# Patient Record
Sex: FEMALE
[De-identification: names, ages, dates, MRNs, and addresses within clinical notes are randomized per-mention and may not be internally consistent; named-entity substitution may affect disease eponyms.]

---

## 2018-06-06 PROBLEM — Z00.00 ENCOUNTER FOR PREVENTIVE HEALTH EXAMINATION: Status: ACTIVE | Noted: 2018-06-06

## 2018-06-10 ENCOUNTER — FORM ENCOUNTER (OUTPATIENT)
Age: 48
End: 2018-06-10

## 2018-06-11 ENCOUNTER — APPOINTMENT (OUTPATIENT)
Dept: ORTHOPEDIC SURGERY | Facility: CLINIC | Age: 48
End: 2018-06-11

## 2018-06-11 ENCOUNTER — APPOINTMENT (OUTPATIENT)
Age: 48
End: 2018-06-11
Payer: MEDICAID

## 2018-06-11 ENCOUNTER — OUTPATIENT (OUTPATIENT)
Dept: OUTPATIENT SERVICES | Facility: HOSPITAL | Age: 48
LOS: 1 days | End: 2018-06-11
Payer: MEDICAID

## 2018-06-11 ENCOUNTER — APPOINTMENT (OUTPATIENT)
Age: 48
End: 2018-06-11

## 2018-06-11 VITALS — RESPIRATION RATE: 16 BRPM | HEIGHT: 66 IN

## 2018-06-11 VITALS — RESPIRATION RATE: 16 BRPM

## 2018-06-11 DIAGNOSIS — Z78.9 OTHER SPECIFIED HEALTH STATUS: ICD-10-CM

## 2018-06-11 DIAGNOSIS — M17.12 UNILATERAL PRIMARY OSTEOARTHRITIS, LEFT KNEE: ICD-10-CM

## 2018-06-11 DIAGNOSIS — S93.421A SPRAIN OF DELTOID LIGAMENT OF RIGHT ANKLE, INITIAL ENCOUNTER: ICD-10-CM

## 2018-06-11 DIAGNOSIS — M25.562 PAIN IN LEFT KNEE: ICD-10-CM

## 2018-06-11 DIAGNOSIS — Z82.49 FAMILY HISTORY OF ISCHEMIC HEART DISEASE AND OTHER DISEASES OF THE CIRCULATORY SYSTEM: ICD-10-CM

## 2018-06-11 PROCEDURE — 73564 X-RAY EXAM KNEE 4 OR MORE: CPT | Mod: 26,LT

## 2018-06-11 PROCEDURE — 20615 TREATMENT OF BONE CYST: CPT | Mod: LT

## 2018-06-11 PROCEDURE — 99204 OFFICE O/P NEW MOD 45 MIN: CPT | Mod: 25

## 2018-06-11 PROCEDURE — 73564 X-RAY EXAM KNEE 4 OR MORE: CPT

## 2018-06-11 RX ORDER — MELOXICAM 15 MG/1
15 TABLET ORAL
Qty: 30 | Refills: 2 | Status: ACTIVE | COMMUNITY
Start: 2018-06-11 | End: 1900-01-01

## 2018-06-12 PROBLEM — S93.421A SPRAIN OF ANKLE, DELTOID, RIGHT: Status: ACTIVE | Noted: 2018-06-11

## 2018-06-12 PROBLEM — M17.12 PRIMARY OSTEOARTHRITIS OF LEFT KNEE: Status: ACTIVE | Noted: 2018-06-12

## 2018-06-12 PROBLEM — M25.562 LEFT KNEE PAIN: Noted: 2018-06-08

## 2018-07-24 ENCOUNTER — FORM ENCOUNTER (OUTPATIENT)
Age: 48
End: 2018-07-24

## 2018-07-25 ENCOUNTER — APPOINTMENT (OUTPATIENT)
Dept: RADIOLOGY | Facility: CLINIC | Age: 48
End: 2018-07-25

## 2018-07-25 ENCOUNTER — APPOINTMENT (OUTPATIENT)
Dept: ORTHOPEDIC SURGERY | Facility: CLINIC | Age: 48
End: 2018-07-25
Payer: MEDICAID

## 2018-07-25 ENCOUNTER — OUTPATIENT (OUTPATIENT)
Dept: OUTPATIENT SERVICES | Facility: HOSPITAL | Age: 48
LOS: 1 days | End: 2018-07-25
Payer: MEDICAID

## 2018-07-25 VITALS — WEIGHT: 165 LBS | HEIGHT: 66 IN | BODY MASS INDEX: 26.52 KG/M2

## 2018-07-25 DIAGNOSIS — M72.2 PLANTAR FASCIAL FIBROMATOSIS: ICD-10-CM

## 2018-07-25 PROCEDURE — 73610 X-RAY EXAM OF ANKLE: CPT | Mod: 26,RT

## 2018-07-25 PROCEDURE — 99213 OFFICE O/P EST LOW 20 MIN: CPT

## 2018-07-25 PROCEDURE — 73610 X-RAY EXAM OF ANKLE: CPT

## 2018-07-27 PROBLEM — M72.2 PLANTAR FASCIITIS, RIGHT: Status: ACTIVE | Noted: 2018-07-27

## 2019-07-09 ENCOUNTER — FORM ENCOUNTER (OUTPATIENT)
Age: 49
End: 2019-07-09

## 2019-07-10 ENCOUNTER — APPOINTMENT (OUTPATIENT)
Dept: ORTHOPEDIC SURGERY | Facility: CLINIC | Age: 49
End: 2019-07-10
Payer: MEDICAID

## 2019-07-10 ENCOUNTER — APPOINTMENT (OUTPATIENT)
Dept: RADIOLOGY | Facility: CLINIC | Age: 49
End: 2019-07-10

## 2019-07-10 ENCOUNTER — OUTPATIENT (OUTPATIENT)
Dept: OUTPATIENT SERVICES | Facility: HOSPITAL | Age: 49
LOS: 1 days | End: 2019-07-10
Payer: MEDICAID

## 2019-07-10 VITALS — BODY MASS INDEX: 26.52 KG/M2 | WEIGHT: 165 LBS | HEIGHT: 66 IN

## 2019-07-10 DIAGNOSIS — M25.462 EFFUSION, LEFT KNEE: ICD-10-CM

## 2019-07-10 LAB
B PERT IGG+IGM PNL SER: NORMAL
COLOR FLD: NORMAL
FLUID INTAKE SUBSTANCE CLASS: NORMAL
LYMPHOCYTES # FLD MANUAL: 34 %
MONOS+MACROS NFR FLD MANUAL: 42 %
NEUTS SEG # FLD MANUAL: 24 %
RBC # FLD MANUAL: ABNORMAL /UL
SYCRY CLARITY: NORMAL
SYCRY COLOR: NORMAL
SYCRY ID: NORMAL
SYCRY TUBE: NORMAL
TOTAL CELLS COUNTED FLD: 475 /UL
TUBE TYPE: NORMAL
URATE AMN-MCNC: NORMAL

## 2019-07-10 PROCEDURE — 73564 X-RAY EXAM KNEE 4 OR MORE: CPT | Mod: 26,LT

## 2019-07-10 PROCEDURE — 73564 X-RAY EXAM KNEE 4 OR MORE: CPT

## 2019-07-10 PROCEDURE — 99214 OFFICE O/P EST MOD 30 MIN: CPT | Mod: 25

## 2019-07-10 PROCEDURE — 20610 DRAIN/INJ JOINT/BURSA W/O US: CPT | Mod: LT

## 2019-07-24 LAB — BACTERIA FLD CULT: NORMAL

## 2024-10-05 ENCOUNTER — EMERGENCY (EMERGENCY)
Facility: HOSPITAL | Age: 54
LOS: 1 days | Discharge: HOME | End: 2024-10-05
Attending: STUDENT IN AN ORGANIZED HEALTH CARE EDUCATION/TRAINING PROGRAM
Payer: SELF-PAY

## 2024-10-05 VITALS
HEART RATE: 55 BPM | HEIGHT: 66 IN | OXYGEN SATURATION: 95 % | SYSTOLIC BLOOD PRESSURE: 116 MMHG | RESPIRATION RATE: 18 BRPM | WEIGHT: 188.94 LBS | DIASTOLIC BLOOD PRESSURE: 71 MMHG | TEMPERATURE: 98 F

## 2024-10-05 PROCEDURE — 96372 THER/PROPH/DIAG INJ SC/IM: CPT

## 2024-10-05 PROCEDURE — 99285 EMERGENCY DEPT VISIT HI MDM: CPT

## 2024-10-05 PROCEDURE — 73200 CT UPPER EXTREMITY W/O DYE: CPT | Mod: 26,RT,MC

## 2024-10-05 PROCEDURE — 73130 X-RAY EXAM OF HAND: CPT

## 2024-10-05 PROCEDURE — 73200 CT UPPER EXTREMITY W/O DYE: CPT | Mod: MC

## 2024-10-05 PROCEDURE — 73110 X-RAY EXAM OF WRIST: CPT

## 2024-10-05 PROCEDURE — 99284 EMERGENCY DEPT VISIT MOD MDM: CPT | Mod: 25

## 2024-10-05 PROCEDURE — 73130 X-RAY EXAM OF HAND: CPT | Mod: 26,RT

## 2024-10-05 PROCEDURE — 73110 X-RAY EXAM OF WRIST: CPT | Mod: 26,RT

## 2024-10-05 RX ORDER — ACETAMINOPHEN 325 MG
650 TABLET ORAL ONCE
Refills: 0 | Status: COMPLETED | OUTPATIENT
Start: 2024-10-05 | End: 2024-10-05

## 2024-10-05 RX ORDER — MORPHINE SULFATE 30 MG/1
6 TABLET, FILM COATED, EXTENDED RELEASE ORAL ONCE
Refills: 0 | Status: DISCONTINUED | OUTPATIENT
Start: 2024-10-05 | End: 2024-10-05

## 2024-10-05 RX ORDER — KETOROLAC TROMETHAMINE 10 MG/1
15 TABLET, FILM COATED ORAL ONCE
Refills: 0 | Status: DISCONTINUED | OUTPATIENT
Start: 2024-10-05 | End: 2024-10-05

## 2024-10-05 RX ADMIN — Medication 500 MILLIGRAM(S): at 12:07

## 2024-10-05 RX ADMIN — Medication 650 MILLIGRAM(S): at 12:07

## 2024-10-05 RX ADMIN — KETOROLAC TROMETHAMINE 15 MILLIGRAM(S): 10 TABLET, FILM COATED ORAL at 12:08

## 2024-10-05 RX ADMIN — MORPHINE SULFATE 6 MILLIGRAM(S): 30 TABLET, FILM COATED, EXTENDED RELEASE ORAL at 13:41

## 2024-10-05 NOTE — ED PROVIDER NOTE - CLINICAL SUMMARY MEDICAL DECISION MAKING FREE TEXT BOX
Patient is a 54-year-old female with no past medical history presenting with right hand pain And edema after doing push-ups last night.  patient states she was doing push-ups on the wall last night.  patient noted edema and pain to the right hand, specifically over the third digit.  VSS NVI.   -   Will control pain, imaging to rule out fractures, reassess.

## 2024-10-05 NOTE — ED PROVIDER NOTE - PHYSICAL EXAMINATION
Gen: no acute distress  Head: normocephalic, atraumatic  Lung: CTAB, no respiratory distress, no wheezing, rales, rhonchi  CV: normal s1/s2, rrr,   Abd: soft, non-tender, non-distended  MSK: R elbow forearm and shoulder wrist non tender full ROM. No ttp over anatomic snuff box. R hand with significant edema to dorsal surface. TTP over distal 2nd 3rd MCP. full ROM to phalanges, Thumb, sensation intact, 2+ pulses capillary refill < 2 seconds  Neuro: No focal neurologic deficits  Skin: No rash, no lacerations, no open wounds

## 2024-10-05 NOTE — ED ADULT NURSE NOTE - FINAL NURSING ELECTRONIC SIGNATURE
Pupils equal, round and reactive to light, Extra-ocular movement intact, eyes are clear b/l 05-Oct-2024 13:40

## 2024-10-05 NOTE — ED PROVIDER NOTE - OBJECTIVE STATEMENT
Patient is a 54-year-old female with no past medical history presenting with right hand pain And edema after doing push-ups last night.  patient states she was doing push-ups on the wall last night.  patient noted edema and pain to the right hand, specifically over the third digit.   Denies any past medical history.   Denies any open wounds, lacerations.  Denies any falls, head strike, LOC.  Patient used Tylenol last night with mild relief. Patient is a 54-year-old female with no past medical history presenting with right hand pain And edema after doing push-ups last night.  patient states she was doing push-ups on the wall last night when she suddenly developed severe pain.  patient noted edema and pain to the right hand, specifically over the third digit.   Denies any past medical history.   Denies any open wounds, lacerations.  Denies any falls, head strike, LOC.  Patient used Tylenol last night with mild relief.

## 2024-10-05 NOTE — ED PROVIDER NOTE - NSFOLLOWUPINSTRUCTIONS_ED_ALL_ED_FT
Follow up with a hand specialists. Call to make an appointment.     Benson Nixon, Eladio Wheeler, [facility]  Orthopaedic Surgery                               Surgery                                              Orthopaedic Surgery  [address], [address]                         [address] [facility]                         [address] [place] [id number]                       [place] [address]                  [place] [address]  Phone: [phone]                             Phone: [phone]                        Phone: [phone]    Ernesto Schaefer, [facility]  Orthopaedic Surgery                               Plastic Surgery  [address], [address]  [place] [address]                    [place] [address]  Phone: [phone]                             Phone: [phone]      Hand Pain  Hand pain can make it hard to do daily activities. Many things can cause hand pain. Some common causes are:  Injuries. These may include:  Broken bones (fractures)and cuts.  Overuse injuries from doing the same movements many times (repetitive activity).  Arthritis.  Lumps in the tendons or joints of the hand and wrist (ganglion cysts).  Nerve compression syndromes (carpal tunnel syndrome).  Inflammation of the tendons (tendinitis).  Infection.  Follow these instructions at home:  Managing pain, stiffness, and swelling    A bag of ice on a towel on the skin.  A heating pad for use on the affected area.   Take over-the-counter and prescription medicines only as told by your health care provider.  If told, put ice on the affected area.  Put ice in a plastic bag.  Place a towel between your skin and the bag.  Leave the ice on for 20 minutes, 2–3 times a day.  If told, apply heat to the affected area before you exercise or as often as told by your provider. Use the heat source that your provider recommends, such as a moist heat pack or a heating pad.  Place a towel between your skin and the heat source.  Leave the heat on for 20–30 minutes.  If your skin turns bright red, remove the ice or heat right away to prevent skin damage. The risk of damage is higher if you cannot feel pain, heat, or cold.  Activity    Take breaks from repetitive activity often.  Minimize stress on your hands and wrists as much as possible.  Do stretches or exercises as told by your provider.  Do not do activities that make your pain worse.  Wear a hand splint or support as told by your provider.  Contact a health care provider if:  Your pain does not get better after a few days.  Your pain gets worse.  Your pain affects your ability to do your daily activities.  Your hand becomes warm, red, or swollen.  Your hand is numb or tingling.  Get help right away if:  Your hand is extremely swollen or is an unusual shape.  Your hand or fingers turn white or blue.  You cannot move your hand, wrist, or fingers.  This information is not intended to replace advice given to you by your health care provider. Make sure you discuss any questions you have with your health care provider.    Document Revised: 07/26/2023 Document Reviewed: 07/26/2023

## 2024-10-05 NOTE — ED PROVIDER NOTE - PROGRESS NOTE DETAILS
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 No fractures. Pain well controlled. NVI. Ready for DC with outpatient hand follow up. Well appearing.

## 2024-10-05 NOTE — ED ADULT NURSE NOTE - OBJECTIVE STATEMENT
53 yo female sitting on a chair c/o swelling of the right hand after doing wall push-up. 53 yo female sitting on a chair c/o pain and swelling of the right hand after doing wall push-up.

## 2024-10-05 NOTE — ED PROVIDER NOTE - PATIENT PORTAL LINK FT
You can access the FollowMyHealth Patient Portal offered by [facility] by registering at the following website: [email or web address]. By joining [employer]’s FollowMyHealth portal, you will also be able to view your health information using other applications (apps) compatible with our system. You can access the FollowMyHealth Patient Portal offered by [facility] by registering at the following website: [email or web address]. By joining [employer]’s FollowMyHealth portal, you will also be able to view your health information using other applications (apps) compatible with our system.